# Patient Record
Sex: MALE | Race: BLACK OR AFRICAN AMERICAN | NOT HISPANIC OR LATINO | Employment: OTHER | URBAN - METROPOLITAN AREA
[De-identification: names, ages, dates, MRNs, and addresses within clinical notes are randomized per-mention and may not be internally consistent; named-entity substitution may affect disease eponyms.]

---

## 2024-05-16 DIAGNOSIS — C79.9 METASTATIC ADENOCARCINOMA: Primary | ICD-10-CM

## 2024-06-06 ENCOUNTER — OFFICE VISIT (OUTPATIENT)
Dept: HEMATOLOGY/ONCOLOGY | Facility: CLINIC | Age: 69
End: 2024-06-06
Payer: MEDICARE

## 2024-06-06 VITALS
BODY MASS INDEX: 10.2 KG/M2 | HEIGHT: 67 IN | SYSTOLIC BLOOD PRESSURE: 139 MMHG | WEIGHT: 65 LBS | RESPIRATION RATE: 14 BRPM | TEMPERATURE: 98 F | OXYGEN SATURATION: 97 % | DIASTOLIC BLOOD PRESSURE: 82 MMHG | HEART RATE: 118 BPM

## 2024-06-06 DIAGNOSIS — C34.90 MALIGNANT NEOPLASM OF LUNG, UNSPECIFIED LATERALITY, UNSPECIFIED PART OF LUNG: Primary | ICD-10-CM

## 2024-06-06 DIAGNOSIS — C79.9 METASTATIC ADENOCARCINOMA: ICD-10-CM

## 2024-06-06 PROCEDURE — 3288F FALL RISK ASSESSMENT DOCD: CPT | Mod: CPTII,,, | Performed by: STUDENT IN AN ORGANIZED HEALTH CARE EDUCATION/TRAINING PROGRAM

## 2024-06-06 PROCEDURE — 1159F MED LIST DOCD IN RCRD: CPT | Mod: CPTII,,, | Performed by: STUDENT IN AN ORGANIZED HEALTH CARE EDUCATION/TRAINING PROGRAM

## 2024-06-06 PROCEDURE — 1101F PT FALLS ASSESS-DOCD LE1/YR: CPT | Mod: CPTII,,, | Performed by: STUDENT IN AN ORGANIZED HEALTH CARE EDUCATION/TRAINING PROGRAM

## 2024-06-06 PROCEDURE — 3008F BODY MASS INDEX DOCD: CPT | Mod: CPTII,,, | Performed by: STUDENT IN AN ORGANIZED HEALTH CARE EDUCATION/TRAINING PROGRAM

## 2024-06-06 PROCEDURE — 1126F AMNT PAIN NOTED NONE PRSNT: CPT | Mod: CPTII,,, | Performed by: STUDENT IN AN ORGANIZED HEALTH CARE EDUCATION/TRAINING PROGRAM

## 2024-06-06 PROCEDURE — 3075F SYST BP GE 130 - 139MM HG: CPT | Mod: CPTII,,, | Performed by: STUDENT IN AN ORGANIZED HEALTH CARE EDUCATION/TRAINING PROGRAM

## 2024-06-06 PROCEDURE — 99205 OFFICE O/P NEW HI 60 MIN: CPT | Mod: ,,, | Performed by: STUDENT IN AN ORGANIZED HEALTH CARE EDUCATION/TRAINING PROGRAM

## 2024-06-06 PROCEDURE — 3079F DIAST BP 80-89 MM HG: CPT | Mod: CPTII,,, | Performed by: STUDENT IN AN ORGANIZED HEALTH CARE EDUCATION/TRAINING PROGRAM

## 2024-06-06 RX ORDER — BUDESONIDE AND FORMOTEROL FUMARATE DIHYDRATE 160; 4.5 UG/1; UG/1
AEROSOL RESPIRATORY (INHALATION)
COMMUNITY
Start: 2024-01-26

## 2024-06-06 RX ORDER — POTASSIUM CHLORIDE 1500 MG/1
20 TABLET, EXTENDED RELEASE ORAL
COMMUNITY
Start: 2023-12-30

## 2024-06-06 RX ORDER — ALBUTEROL SULFATE 90 UG/1
2 AEROSOL, METERED RESPIRATORY (INHALATION) EVERY 6 HOURS PRN
COMMUNITY
Start: 2023-12-30

## 2024-06-06 RX ORDER — FLUTICASONE FUROATE, UMECLIDINIUM BROMIDE AND VILANTEROL TRIFENATATE 200; 62.5; 25 UG/1; UG/1; UG/1
POWDER RESPIRATORY (INHALATION)
COMMUNITY
Start: 2024-02-04

## 2024-06-06 RX ORDER — APIXABAN 5 MG/1
TABLET, FILM COATED ORAL
COMMUNITY
Start: 2024-05-03

## 2024-06-06 NOTE — PROGRESS NOTES
Referring provider:  Dr. Pino    Reason for consultation:  Lung cancer      Diagnosis:  Lung adenocarcinoma, stage IV      Current treatment:      Treatment history:      HPI:    Patient was a 69-year-old with history of COPD who had symptoms of left hand numbness as well as swelling which led to further workup including a CT chest in November 20, 2024 which revealed a left apical lung mass with bone destruction which was likely thought to be causing his symptoms of left hand swelling and numbness.  Patient had symptoms of a palpable subcutaneous mass over the posterior right thigh for several weeks for which he was seen by Dr. Pino and underwent a biopsy on 04/11/2024, pathology from which resulted with metastatic adenocarcinoma consistent with lung primary.  Patient was referred to our clinic for further treatment recommendations.  He presented to clinic on 06/06/2024 to establish care.      Patient is a former smoker, denies recreational drug use or alcohol use, lives by himself currently he has a daughter who lives close by.  Patient was daughter reports him being dependent on most of his ADLs.  He denies any falls.    Interval history:    Today, 06/06/2024, patient reports pain in his left arm and numbness in his left fingers but denies any other acute concerns.    Pathology     04/17/2024 right leg mass core biopsy:  Metastatic adenocarcinoma consistent with lung primary.      Imaging     04/01/2024 CT chest without IV contrast:  Development of a left apical lung mass, Pancoast tumor with associated bone destruction described above.  This mass extends in the lower neck on the left likely accounting for left hand swelling.  COPD and other chronic findings as noted above.    History reviewed. No pertinent past medical history.    History reviewed. No pertinent surgical history.    No family history on file.    Social History     Socioeconomic History    Marital status:    Tobacco Use    Smoking status:  Never    Smokeless tobacco: Never   Substance and Sexual Activity    Alcohol use: Never    Drug use: Never    Sexual activity: Yes       Current Outpatient Medications   Medication Sig Dispense Refill    albuterol (PROVENTIL/VENTOLIN HFA) 90 mcg/actuation inhaler Inhale 2 puffs into the lungs every 6 (six) hours as needed.      ELIQUIS 5 mg Tab Take by mouth.      potassium chloride (K-TAB) 20 mEq Take 20 mEq by mouth.      SYMBICORT 160-4.5 mcg/actuation HFAA Inhale into the lungs.      TRELEGY ELLIPTA 200-62.5-25 mcg inhaler Inhale into the lungs.       No current facility-administered medications for this visit.       Review of patient's allergies indicates:  No Known Allergies      Review of systems  CONSTITUTIONAL: no fevers, no chills, no weight loss, + fatigue, no weakness  HEMATOLOGIC: no abnormal bleeding, no abnormal bruising, no drenching night sweats  ONCOLOGIC: no new masses or lumps  HEENT: no vision loss, no tinnitus or hearing loss, no nose bleeding, no dysphagia, no odynophagia  CVS: no chest pain, no palpitations, no dyspnea on exertion  RESP: no shortness of breath, no hemoptysis, no cough  GI: no nausea, no vomiting, no diarrhea, no constipation, no melena, no hematochezia, no hematemesis, no abdominal pain, no increase in abdominal girth  : no dysuria, no hematuria, no hesitancy, no scrotal swelling, no discharge  INTEGUMENT: no rashes, no abnormal bruising, no nail pitting, no hyperpigmentation  NEURO: no falls, no memory loss, no paresthesias or dysesthesias, no urofecal incontinence or retention, no loss of strength on any extremity  MSK: no back pain, no new joint pain, no joint swelling  PSYCH: no suicidal or homicidal ideation, no depression, no insomnia, no anhedonia  ENDOCRINE: no heat or cold intolerance, no polyuria, no polydipsia      Physical Exam:  Vitals:    06/06/24 1122   BP: 139/82   Pulse: (!) 118   Resp: 14   Temp: 97.8 °F (36.6 °C)     GA: AAOx3, NAD, in a wheelchair,  appears malnourished, accompanied by his daughter.  HEENT: NCAT,  moist oral mucous membranes  LYMPH: no cervical, axillary or supraclavicular adenopathy  CVS: s1s2 RRR, no M/R/G  RESP:  Decreased by lateral air entry, coarse crackles.  No wheezing.  ABD: soft, NT, ND, BS+, no hepatosplenomegaly  EXT:  A 3 cm while tender firm mass appreciated over the left posterior thigh.  SKIN: no rashes, warm and dry  NEURO: normal mentation, strength 5/5 on all 4 extremities, no sensory deficits        Assessment    # Lung adenocarcinoma, stage IV  Reviewed imaging studies and pathology report available to us   Discussed with patient and his daughter the diagnosis of lung adenocarcinoma, the advanced age, incurable nature of disease and palliative nature of treatment   Patient has had a significant decline in his ECOG and is currently dependent on most of his ADLs.    Discussed the palliative nature of treatment including possible single agent immunotherapy and radiation therapy for symptomatic relief   I do not believe he would be a good candidate for systemic chemotherapy due to poor ECOG        Plan   NGS and PDL1 on previously obtained biopsy  PET-CT   MRI brain with and without contrast   Oxycodone 5 mg Q 6 hours p.r.n. for cancer-related pain  Plan to follow-up in 7-10 days to discuss above workup and further treatment recommendations.          A total of  60 minutes were spent in review of records, interpretation of test, coordination of care, discussion and counseling with the patient.        Portions of the record may have been created with voice recognition software. Occasional wrong-word or sound-a-like substitutions may have occurred due to the inherent limitations of voice recognition software. Read the chart carefully and recognize, using context, where substitutions have occurred.

## 2024-06-07 DIAGNOSIS — C34.82 MALIGNANT NEOPLASM OF OVERLAPPING SITES OF LEFT LUNG: Primary | ICD-10-CM

## 2024-06-07 DIAGNOSIS — C79.9 METASTATIC ADENOCARCINOMA: Primary | ICD-10-CM

## 2024-06-07 DIAGNOSIS — C34.90 MALIGNANT NEOPLASM OF LUNG, UNSPECIFIED LATERALITY, UNSPECIFIED PART OF LUNG: ICD-10-CM

## 2024-06-18 ENCOUNTER — TELEMEDICINE (OUTPATIENT)
Dept: NEUROLOGY | Facility: HOSPITAL | Age: 69
End: 2024-06-18
Payer: MEDICARE

## 2024-06-18 DIAGNOSIS — I63.89 ACUTE ARTERIAL ISCHEMIC STROKE, MULTIFOCAL, MULTIPLE VASCULAR TERRITORIES: Primary | ICD-10-CM

## 2024-06-18 PROCEDURE — G0427 INPT/ED TELECONSULT70: HCPCS | Mod: 95,,, | Performed by: NURSE PRACTITIONER

## 2024-06-18 NOTE — TELEMEDICINE CONSULT
[facility]  Consult Note      Consult Information  Consulting Provider: Mari Castillo DNP  Patient Location: [place]  Consulted by: MAGDALENA Nguyen MD     Spoke hospital nurse at bedside with patient assisting consultant.  Patient information was obtained from  Medical Records and nurse .       Neurology Documentation  NIH Stroke Scale:    Level of Consciousness: 1 - drowsy  LOC Questions: 2 - answers none correctly  LOC Commands: 0 - performs both correctly  Best Gaze: 0 - normal  Visual: 3 - bilateral hemianopia (No blink to threat either eye - unable to identify objects held up)  Facial Palsy: 0 - normal  Motor Left Arm: 4 - no movement  Motor Right Arm: 0 - no drift  Motor Left Le - no drift  Motor Right Le - no drift  Limb Ataxia: 0 - absent  Sensory: 0 - normal  Best Language: 1 - mild to moderate aphasia (Limited speech sample)  Dysarthria: 1 - mild to moderate dysarthria  Extinction and Inattention: 0 - no neglect  NIH Stroke Scale Total: 12          Medical Decision Making  HPI:  69 y.o. male with LUE DVT (Eliquis), Lung CA with mets to bone, COPD, BPH presented poor appetite, HA, and left arm weakness.  It appears the left arm has been weak for about 4 months.  He is currently bed bound.  Patient admitted with metabolic encephalopathy, pneumonia, and failure to thrive.  CT head revealed multiple areas of stroke.  Patient drowsy but easily aroused.  He is unable to provide any history.  He is not able to recognize that he is in the hospital or why.       Medical records faxed for review: H&P, CT head report and images, CTA head and neck report and images, MRI Brain report and images, Labs, preliminary TTE report by technologist.    Images personally reviewed and interpreted:  Study: Head CT, CTA Head & Neck, and MRI Brain  Study Interpretation: CT head: Hypodensity bilateral cerebellum right > left, as well as right PCA and left PCA, right > left.  No acute hemorrhage.  MRI  brain:  Diffusion restriction noted in the same areas as well as right and left MCA territories, right > left.  Significant motion artifact.  No obvious hemorrhage.  CTA head and neck: Calcified plaque left carotid bifurcation/proximal ICA concern for high-grade stenosis.     Additional studies reviewed:   Study: TTE  Study Interpretation:     Laboratory studies reviewed:  CBC, CMP, Coags    Documentation personally reviewed:  Notes: H&P     Assessment and plan:  69 y.o. male with LUE DVT (Eliquis), Lung CA with mets to bone, COPD, BPH now with multiple areas of embolic appearing infarcts located primarily in the right greater than left cerebellum, right greater than left PCA, and right greater than left MCA.  CTA with high-grade stenosis of the left carotid bifurcation/proximal ICA.  No other high-grade stenosis or occlusion noted.  Preliminary TTE with normal EF, no obvious thrombus, questionable right to left shunt, atrium not reported.  Etiology includes hypercoagulable state in setting of CA versus cardioembolic event.  ICA stenosis likely incidental.  Currently on AC for DVT.    Recommendations  -Continue home eliquis - no indication for ASA or plavix while on eliquis from a stroke perspective; can continue ASA if needed from Cardiology perspective (no indication for triple therapy)  -If patient will be on lifelong AC no further stroke workup needed; if plans to discontinue AC may benefit from further hematological and cardiac evaluation to further investigate thromboembolic risk  -Atorvastatin 40mg daily for goal LDL < 70  -Stroke risk factor modification  -Follow therapy recommendations      Post charge discharge plan:  Clinic follow up: Vascular Neurology     Visit Type: In-person or Virtual Visit  Timeframe:  4 weeks    Additional Physical Exam, History, & ROS  Review of Systems   Unable to perform ROS: Mental status change     Physical Exam  Constitutional:       General: He is not in acute distress.      Appearance: He is cachectic.   HENT:      Head: Normocephalic.   Pulmonary:      Effort: Pulmonary effort is normal. No respiratory distress.   Abdominal:      General: There is no distension.      Tenderness: There is no guarding.   Musculoskeletal:      Right lower leg: No edema.      Left lower leg: No edema.   Skin:     General: Skin is dry.   Psychiatric:         Attention and Perception: He is inattentive.         Speech: Speech is delayed.         Behavior: Behavior is slowed.         Cognition and Memory: Cognition is impaired.       Past Medical History:   Diagnosis Date    BPH (benign prostatic hyperplasia)     COPD (chronic obstructive pulmonary disease)     DVT (deep venous thrombosis)     Lung cancer     Metastatic cancer to bone      Past Surgical History:   Procedure Laterality Date    KNEE SURGERY       No family history on file.      Diagnoses  Embolic multifocal multiple territory acute ischemic stroke    Mari Castillo NP    Neurology consultation requested by spoke provider. Audiovisual encounter with the patient performed using a secure connection.  Results and impressions from the visit are documented on this note and were communicated to the consulting provider/team via direct communication. The note has been shared for addition to the patients electronic medical record.    Collaborating Physician, Dr. Thacker, was available during today's encounter. Any change to plan along with cosign to appear in the EMR.